# Patient Record
Sex: MALE | Race: WHITE | NOT HISPANIC OR LATINO | URBAN - METROPOLITAN AREA
[De-identification: names, ages, dates, MRNs, and addresses within clinical notes are randomized per-mention and may not be internally consistent; named-entity substitution may affect disease eponyms.]

---

## 2017-02-20 ENCOUNTER — OFFICE (OUTPATIENT)
Dept: URBAN - METROPOLITAN AREA CLINIC 11 | Facility: CLINIC | Age: 52
End: 2017-02-20

## 2017-02-20 VITALS
HEART RATE: 74 BPM | DIASTOLIC BLOOD PRESSURE: 50 MMHG | HEIGHT: 67 IN | WEIGHT: 291 LBS | SYSTOLIC BLOOD PRESSURE: 107 MMHG | RESPIRATION RATE: 16 BRPM

## 2017-02-20 DIAGNOSIS — D50.9 IRON DEFICIENCY ANEMIA, UNSPECIFIED: ICD-10-CM

## 2017-02-20 DIAGNOSIS — I50.9 HEART FAILURE, UNSPECIFIED: ICD-10-CM

## 2017-02-20 DIAGNOSIS — N18.9 CHRONIC KIDNEY DISEASE, UNSPECIFIED: ICD-10-CM

## 2017-02-20 DIAGNOSIS — K59.00 CONSTIPATION, UNSPECIFIED: ICD-10-CM

## 2017-02-20 DIAGNOSIS — E66.01 MORBID (SEVERE) OBESITY DUE TO EXCESS CALORIES: ICD-10-CM

## 2017-02-20 DIAGNOSIS — G47.30 SLEEP APNEA, UNSPECIFIED: ICD-10-CM

## 2017-02-20 DIAGNOSIS — J96.10 CHRONIC RESPIRATORY FAILURE, UNSPECIFIED WHETHER WITH HYPOXI: ICD-10-CM

## 2017-02-20 DIAGNOSIS — R19.4 CHANGE IN BOWEL HABIT: ICD-10-CM

## 2017-02-20 DIAGNOSIS — E11.9 TYPE 2 DIABETES MELLITUS WITHOUT COMPLICATIONS: ICD-10-CM

## 2017-02-20 PROCEDURE — 99204 OFFICE O/P NEW MOD 45 MIN: CPT | Performed by: INTERNAL MEDICINE

## 2017-02-20 RX ORDER — POLYETHYLENE GLYCOL 3350, SODIUM SULFATE ANHYDROUS, SODIUM BICARBONATE, SODIUM CHLORIDE, POTASSIUM CHLORIDE 236; 22.74; 6.74; 5.86; 2.97 G/4L; G/4L; G/4L; G/4L; G/4L
POWDER, FOR SOLUTION ORAL
Qty: 1 | Refills: 0 | Status: ACTIVE
Start: 2017-02-20

## 2017-02-20 NOTE — SERVICENOTES
The patient apparently has iron deficiency anemia.  Normally, for this GI evaluation is recommended with EGD and colonoscopy.  I will also check his stool for blood as if this is positive he may also need capsule endoscopy if the other scopes are negative.  Unfortunately, the patient has multiple comorbidities and is at increased risk from a procedural standpoint.  I did discuss the possible risk of the procedure and recommended that we do it this will need to be done at the hospital with anesthesia after cardiac and pulmonary clearance need to have his Plavix held.  The he is more volume overloaded and so I did recommend that he get back in touch with his cardiologist to see if more diuresis is needed prior to the procedure.  Once we have obtain clearance from his cardiologist and pulmonologist we can proceed accordingly.

## 2017-02-20 NOTE — SERVICEHPINOTES
Mr. Benitez is a 51-year-old man here for evaluation of iron deficiency anemia. The patient states that he has a long history of multiple comorbidities including CHF and obstructive sleep apnea, as well as some chronic kidney disease. He has had a tracheostomy placed because of what sounds like chronic respiratory failure and obstructive sleep apnea. He does see a pulmonologist because of what sounds like COPD, and he is also on home oxygen. He states that he was in the hospital at [facility] in October 2016 and at that time had some constipation issues as an aside. He was given some stool softeners and apparently had diarrhea for about a month for which she has taken Imodium which has helped significantly, though he still does have some occasional loose bowel movements. He was then seen by his primary cardiologist who referred him to Dr. Paiz where hematocrit was 36 and blood work reportedly was positive for iron deficiency, however I do not have records of these labs. He was started on iron tablets and it was recommended that he have a GI evaluation given his age. The patient does have a family history of colon polyps in his mother but she was in her 60s. There is no first-degree family history of colon cancer. He denies any problems with rectal bleeding or melena, but he states that his stomach have been darker since starting iron. He does take Plavix. He denies any hematemesis, nausea, vomiting, abdominal pain, or dysphagia.

## 2017-03-08 ENCOUNTER — OFFICE (OUTPATIENT)
Dept: URBAN - METROPOLITAN AREA CLINIC 11 | Facility: CLINIC | Age: 52
End: 2017-03-08

## 2017-03-08 DIAGNOSIS — D50.9 IRON DEFICIENCY ANEMIA, UNSPECIFIED: ICD-10-CM

## 2017-03-08 PROCEDURE — 82274 ASSAY TEST FOR BLOOD FECAL: CPT | Mod: QW | Performed by: INTERNAL MEDICINE

## 2017-03-15 ENCOUNTER — ON CAMPUS - OUTPATIENT (OUTPATIENT)
Dept: URBAN - METROPOLITAN AREA HOSPITAL 131 | Facility: HOSPITAL | Age: 52
End: 2017-03-15
Payer: COMMERCIAL

## 2017-03-15 DIAGNOSIS — K29.00 ACUTE GASTRITIS WITHOUT BLEEDING: ICD-10-CM

## 2017-03-15 DIAGNOSIS — D12.4 BENIGN NEOPLASM OF DESCENDING COLON: ICD-10-CM

## 2017-03-15 DIAGNOSIS — K63.5 POLYP OF COLON: ICD-10-CM

## 2017-03-15 DIAGNOSIS — D50.9 IRON DEFICIENCY ANEMIA, UNSPECIFIED: ICD-10-CM

## 2017-03-15 DIAGNOSIS — D12.3 BENIGN NEOPLASM OF TRANSVERSE COLON: ICD-10-CM

## 2017-03-15 DIAGNOSIS — K29.70 GASTRITIS, UNSPECIFIED, WITHOUT BLEEDING: ICD-10-CM

## 2017-03-15 DIAGNOSIS — D12.0 BENIGN NEOPLASM OF CECUM: ICD-10-CM

## 2017-03-15 PROCEDURE — 45385 COLONOSCOPY W/LESION REMOVAL: CPT | Performed by: INTERNAL MEDICINE

## 2017-03-15 PROCEDURE — 43239 EGD BIOPSY SINGLE/MULTIPLE: CPT | Mod: 59 | Performed by: INTERNAL MEDICINE
